# Patient Record
(demographics unavailable — no encounter records)

---

## 2024-12-12 NOTE — DISCUSSION/SUMMARY
[FreeTextEntry1] : The visit was provided via telehealth using real-time 2-way audio visual technology. The patient, Curtis Jean, was located at work in [place], at the time of the visit. The Genetic Counselor, An Arana, was located in [place], [place]. The patient and the Genetic Counselor both participated in the telehealth encounter. Genetic counseling student, Patricia Tan, also participated in this session. Consent for telehealth services was given on 2024 by the patient, Curtis Jean.  REASON FOR CONSULT Curtis Jean is a 48-year-old female who was referred by Dr. Vimal Cardenas for cancer genetic counseling and risk assessment due to a recent diagnosis of a neuroendocrine tumor and a personal history of colorectal polyps.   RELEVANT MEDICAL HISTORY Ms. 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 Colonoscopy performed in 2024 also identified four tubular adenomas and hyperplastic polyps in the rectum as well as colonic mucosa with foci of adenomatous changes and hyperplastic changes in the ascending colon, transverse colon, and descending colon. Focal adenomatous changes and hyperplastic polyps were also identified in the sigmoid colon. Chromogranin A levels were tested on 2024 and revealed to be abnormal, 637.6 ng/mL (normal range 0.0-101.8). PET scan performed in 2024 revealed intensely somatostatin-receptor avid lesion in the terminal ileum with adjacent metastatic lymphadenopathy, focus within the liver suspicious for metastatic disease, and intensely avid mediastinal nodes, or distant metastatic disease above the diaphragm is not excluded. MRI performed in 2024 revealed small subtle hepatic metastasis, focal enhancement/mural thickening corresponds to patient's known primary terminal ileal neuroendocrine tumor, and metastatic mesenteric adenopathy. Patient is planning to undergo 3 months of neoadjuvant somatostatin analog therapy prior to robotic right hemicolectomy and liver resection/ablation.  OTHER MEDICAL AND SURGICAL HISTORY:  -	 Medical History: hyperlipidemia, GERD -	Surgical History: bunionectomy, right knee surgery    PAST OB/GYN HISTORY:  Obstetrical History:   Age at Menarche: 9  Menopausal with LMP at age 45   Age at First Live Birth: 15  Oral Contraceptive Use: No Other Contraceptive Use: Depo-Provera injection, >20 years in total Hormone Replacement Therapy: No   CANCER SCREENING HISTORY:    Breast:  -	Mammography: last , reportedly normal. Patient reports that she is followed for a benign right breast nodule. -	Sonography: No -	MRI: No -	Biopsies: No  GYN: -	Pelvic Examination: last , reportedly normal  -	Sonography: No -	CA-125: No Colon: -	Colonoscopy: last 2024, multiple colorectal polyps were identified (see relevant medical history section) as well as a neuroendocrine tumor of the ileum. This was the patient's first colonoscopy. -	Upper Endoscopy: last 10/2024, inactive mild chronic gastritis with focal foveolar hyperplasia. Fine needle biopsy was performed for periesophageal lymph node which revealed scant fibrous and fibromuscular tissue and heme, and tissue was noted to be insufficient for evaluation.  -	FOBT: Patient reports a positive FOBT this year with her primary care team which prompted her undergoing a colonoscopy. Skin:   -	FBSE: No -	Lesions biopsied/removed: Yes, patient reports having a benign skin lesion excised >10 years ago.    SOCIAL HISTORY: -	Tobacco-product use: Yes, former (quit 5 months ago; approximately 30 years in total)  FAMILY HISTORY: Maternal ancestry and paternal ancestry were reported as [place]. Ashkenazi Buddhism ancestry and consanguinity were denied. A detailed family history of cancer was ascertained. Relevant diagnoses are detailed below and in the scanned pedigree.   To Ms. Jean's knowledge, no one in the family has had germline testing for cancer susceptibility.   	 	RISK ASSESSMENT: Ms. Jean's personal history of colorectal polyps and a neuroendocrine tumor of the ileum and/or family history of colorectal cancer is suggestive of an inherited predisposition to colorectal polyps/colorectal cancer and neuroendocrine tumors and related cancers. We recommended genetic testing for genes associated with colorectal cancer and neuroendocrine tumors. This test analyzes the following genes: APC, AXIN2, BMPR1A, CDH1, CDKN1B, EPCAM, FH, GREM1, MAX, MBD4, MEN1, MLH1, MSH2, MSH3, MSH6, MUTYH, NF1, NTHL1, PMS2, POLD1, POLE, OTMIA2U, PTEN, RET, SDHA, SDHAF2, SDHB, SDHC, SDHD, SMAD4, STK11, BNHU938, TP53, TSC1, TSC2, VHL.  We discussed the risks, benefits and limitations, and implications of genetic testing. We also discussed the psychosocial implications of genetic testing. Possible test results were reviewed with Ms. Jean, along with associated medical management options.   Ms. Jean verbally consented to the above mentioned genetic testing panel. Informed consent form was emailed to the patient, and a saliva sample kit will be mailed to the patient. Once the sample has been collected and sent out, Ms. Jean will inform us.   PLAN:  1.	Saliva kit was sent to Ms. Jean's home for collection. Once collected and dropped off, patient will inform us.  2.	Ms. Jean was sent a copy of the informed consent via email to be filled, signed, and returned to us. 3.	We will contact Ms. Jean once the results are available and will schedule a follow-up appointment, as needed. Results generally return in 2-3 weeks from the day the sample is received in the lab.  For any additional questions please call [facility] at [phone].    An Arana MS, [facility] Genetic Counselor, [facility]   CC:  Dr. Vimal Cardenas

## 2025-01-17 NOTE — DISCUSSION/SUMMARY
[FreeTextEntry1] : RESULTS TRANSMISSION Curtis Jean is a 48-year-old female who was called on 01/13/2025 for a discussion regarding their genetic testing results related to hereditary cancer predisposition.   Ms. Jean was originally seen at [facility] on 12/11/2024 for hereditary cancer predisposition risk assessment due to a recent diagnosis of a neuroendocrine tumor and a personal history of colorectal polyps. Ms. Jean decided to pursue genetic testing for genes associated with colorectal cancer and neuroendocrine tumors offered by [facility].  TEST RESULTS:   PMS2 VARIANT OF UNCERTAIN SIGNIFICANCE (VUS) (c. 149G>A; p. G50D)  No pathogenic (disease-causing) variants or additional VUSs were detected in the following genes:  APC, AXIN2, BMPR1A, CDH1, CDKN1B, EPCAM, FH, GREM1, MAX, MBD4, MEN1, MLH1, MSH2, MSH3, MSH6, MUTYH, NF1, NTHL1, PMS2, POLD1, POLE, OHBMI4I, PTEN, RET, SDHA, SDHAF2, SDHB, SDHC, SDHD, SMAD4, STK11, VKDY666, TP53, TSC1, TSC2, VHL.  RESULTS INTERPRETATION AND ASSESSMENT: At this time the available evidence is insufficient to determine the role of this VUS in disease and the clinical significance of this result is uncertain. Individuals with a pathogenic mutation in the PMS2 gene may have an increased risk for colorectal cancer and endometrial cancer. It is unknown if the patient has an increased risk for the cancers associated with the PMS2 gene at this time.   The detection of this VUS should not currently change the patient's medical management. It is NOT recommended at this time that family members use this result for predictive genetic testing or medical management decisions. With more research, a VUS may be reclassified as either disease-causing or benign. Ms. Jean was encouraged to contact us every 2-3 years to inquire about any new information for this variant, or sooner if there are any changes in her personal or family history of cancer.  Such updates could possibly change our risk assessment and recommendations.  We also discussed that, while no clear cause of the patient's personal and family history of cancer was identified, this result, while reassuring, does entirely not rule out a hereditary cancer risk in the patient. It is possible, although unlikely, the patient has a mutation in one of the genes tested that is not detectable by this analysis, or has a mutation in a different gene, either known or unknown. It is also possible there is a hereditary cancer predisposition in the family, but the patient did not inherit it.  Given Ms. Jean's personal and current reported family history of cancer, and her negative genetic test results, the following screening guidelines and risk-reducing recommendations were discussed:  COLON: - Long-term management and surveillance should be based on Ms. Jean's on- or post-treatment protocol for her personal history of a neuroendocrine tumor of the ileum as recommended by her oncology team. -We also discussed with Ms. Jean that she should follow her gastroenterologist's recommendations for colonoscopy intervals based on her personal history of colorectal polyps on her first colonoscopy in 08/2024. She stated that she plans to undergo her next colonoscopy in the next 2-3 months. She was highly encouraged to call [facility] after she undergoes her next colonoscopy to share her records with [facility] for review as pathology results from her next colonoscopy may change our management recommendations.   OTHER:  - In the absence of other indications, Ms. Jean should practice age-appropriate cancer screening of other organ systems as recommended for the general population.  In addition, we discussed Ms. Jean's siblings should undergo colonoscopies at their current ages if they have not started to do so.    PLAN: 1.	See above for recommended management.  2.	Testing of family members based on this VUS is not recommended.  3.	The patient was encouraged to contact us every 2-3 years to check on any changes in interpretation of the VUS, or sooner if there are changes in her personal or family history of cancer. 4.	Patient informed consult note(s) will be available through their [facility] patient portal and genetic test results will be released via [facility]'s laboratory portal.  5.	Ms. Jean will recontact [facility] after her next colonoscopy to review the above management recommendations.    For any additional questions please call [facility] at [phone].    An Arana MS, [facility] Genetic Counselor, [facility]   CC:  Patient Dr. Vimal Cardenas

## 2025-02-05 NOTE — HISTORY OF PRESENT ILLNESS
[de-identified] : 48F with pmhx of HLD, GERD underwent colonoscopy with extensive polyps removed pathology tubular adenoma. Additional polypoid lesion seen in the terminal ileum, atypical for adenoma, biopsies taken showing neuroendocrine tumor referred by Dr. Bridges.  C/o constipation but currently going daily, tolerating diet, denies weight loss. Guaiac stool was positive at PCP and pt was referred for colonoscopy.   24 PET- 1.  Intensely somatostatin-receptor avid lesion in the terminal ileum with adjacent metastatic lymphadenopathy. 2.  Focus within the liver suspicious for metastatic disease. 3.  Intensely avid mediastinal nodes, or distant metastatic disease above the diaphragm is not excluded.    MRI abd-IMPRESSION: 1. Small subtle hepatic metastasis. 2. 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 3. Metastatic mesenteric adenopathy.  Labs- CBC CMP CA 19-9- WNL CromoA-637.6  24- Pt reports feeling well, denies abdominal pain at this time. Tolerating diet. HR in increase as pt is very anxious to find out test results.   EGD/EUS (10/16/2024): One mediastinal lymph node abutting the mid esophagus was found at 24 cm from the incisors. It was round, hypoechoic, and well defined. It measured 8 mm by 5 mm in maximal dimensions. Fine needle biopsy was performed. Also, biopsy from stomach was obtained. Path-1 Stomach biopsy 2 PeriEsophageal lymph node FNB Final Diagnosis 1. Stomach, biopsy  Chronic gastritis, mild, inactive with focal foveolar hyperplasia  Cresyl violet stain for H. pylori is negative 2. Periesophageal lymph node, fineneedle biopsy  Scant fibrous and fibromuscular tissue and heme  Tissue insufficient for evaluation Pt has been on lanreotide injection monthly with Dr. Webster. Chromogranin level decreased from 637.6 to 148.1 12/10/2024,  189.3 as of 1/10/2025.  2025 PET (NW)- 1.  Previously seen intensely somatostatin-receptor avid lesion in the terminal ileum appears somewhat smaller with decreased uptake. However, the distribution of activity at immediately adjacent lymph nodes may have changed, with some nodes now suspected to demonstrate markedly increased somatostatin receptor uptake. 2.  Revisualized focus within the LEFT lobe of the liver is essentially unchanged. Additional small focus is suspected in the RIGHT lobe along the border of the liver; repeat MRI may be helpful. 3.  Unchanged somatostatin receptor avid paraesophageal node in the chest.  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   ECO PCP: Dr. LAN, T[place] [address] [place] [id number]. Directions [phone]. GI: Dr. Bridges

## 2025-02-05 NOTE — ASSESSMENT
[FreeTextEntry1] : 48F with pmhx of HLD, GERD underwent colonoscopy with extensive polyps removed pathology tubular adenoma. Additional polypoid lesion seen in the terminal ileum, atypical for adenoma, biopsies taken showing neuroendocrine tumor referred by Dr. Bridges.   9/4/24 PET- 1. Intensely somatostatin-receptor avid lesion in the terminal ileum with adjacent metastatic lymphadenopathy. 2. Focus within the liver suspicious for metastatic disease. 3. Intensely avid mediastinal nodes, or distant metastatic disease above the diaphragm is not excluded.  Plan: - As per [facility] TB consensus recommendations were made for neoadjuvant somatostatin analog based on biopsy for 3 months for tumor stability -Candidate for surgical resection- robotic right hemicolectomy and liver resection/ ablation post treatment -EUS to bx new esophageal node seen on imaging- Will email Dr. Bridges to schedule.  -PET scan ordered for tumor biology/ treatment response after 4 months of systemic treatment. -Return 4 months after somatostatin analog treatment   I have discussed the diagnosis, therapeutic plan and options with the patient at length. Patient expressed verbal understanding to proceed with proposed plan. All questions answered.

## 2025-02-05 NOTE — CONSULT LETTER
[Dear  ___] : Dear  [unfilled], [Consult Letter:] : I had the pleasure of evaluating your patient, [unfilled]. [Please see my note below.] : Please see my note below. [Consult Closing:] : Thank you very much for allowing me to participate in the care of this patient.  If you have any questions, please do not hesitate to contact me. [Sincerely,] : Sincerely, [DrAzam  ___] : Dr. BECERRA [FreeTextEntry3] : Vimal Cardenas MD, FICS, FACS, ADITI Director of Surgical Oncology- [facility] , Department of Surgery [facility] at [address]- [address]   [address] [place], [place]- [address]   (mob) [phone] (o) [phone] (f) [phone]

## 2025-02-05 NOTE — CONSULT LETTER
[Dear  ___] : Dear  [unfilled], [Consult Letter:] : I had the pleasure of evaluating your patient, [unfilled]. [Please see my note below.] : Please see my note below. [Consult Closing:] : Thank you very much for allowing me to participate in the care of this patient.  If you have any questions, please do not hesitate to contact me. [Sincerely,] : Sincerely, [DrAzam  ___] : Dr. BECERRA [FreeTextEntry3] : Vimal Cardenas MD, FICS, FACS, ADITI Director of Surgical Oncology- [facility] , Department of Surgery [facility] at [address]- [address]   [address] [place], [place]- [address]   (mob) [phone] (o) [phone] (f) [phone]

## 2025-02-05 NOTE — HISTORY OF PRESENT ILLNESS
[de-identified] : 48F with pmhx of HLD, GERD underwent colonoscopy with extensive polyps removed pathology tubular adenoma. Additional polypoid lesion seen in the terminal ileum, atypical for adenoma, biopsies taken showing neuroendocrine tumor referred by Dr. Bridges.  C/o constipation but currently going daily, tolerating diet, denies weight loss. Guaiac stool was positive at PCP and pt was referred for colonoscopy.   24 PET- 1.  Intensely somatostatin-receptor avid lesion in the terminal ileum with adjacent metastatic lymphadenopathy. 2.  Focus within the liver suspicious for metastatic disease. 3.  Intensely avid mediastinal nodes, or distant metastatic disease above the diaphragm is not excluded.    MRI abd-IMPRESSION: 1. Small subtle hepatic metastasis. 2. 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 3. Metastatic mesenteric adenopathy.  Labs- CBC CMP CA 19-9- WNL CromoA-637.6  24- Pt reports feeling well, denies abdominal pain at this time. Tolerating diet. HR in increase as pt is very anxious to find out test results.   EGD/EUS (10/16/2024): One mediastinal lymph node abutting the mid esophagus was found at 24 cm from the incisors. It was round, hypoechoic, and well defined. It measured 8 mm by 5 mm in maximal dimensions. Fine needle biopsy was performed. Also, biopsy from stomach was obtained. Path-1 Stomach biopsy 2 PeriEsophageal lymph node FNB Final Diagnosis 1. Stomach, biopsy  Chronic gastritis, mild, inactive with focal foveolar hyperplasia  Cresyl violet stain for H. pylori is negative 2. Periesophageal lymph node, fineneedle biopsy  Scant fibrous and fibromuscular tissue and heme  Tissue insufficient for evaluation Pt has been on lanreotide injection monthly with Dr. Webster. Chromogranin level decreased from 637.6 to 148.1 12/10/2024,  189.3 as of 1/10/2025.  2025 PET (NW)- 1.  Previously seen intensely somatostatin-receptor avid lesion in the terminal ileum appears somewhat smaller with decreased uptake. However, the distribution of activity at immediately adjacent lymph nodes may have changed, with some nodes now suspected to demonstrate markedly increased somatostatin receptor uptake. 2.  Revisualized focus within the LEFT lobe of the liver is essentially unchanged. Additional small focus is suspected in the RIGHT lobe along the border of the liver; repeat MRI may be helpful. 3.  Unchanged somatostatin receptor avid paraesophageal node in the chest.  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   ECO PCP: Dr. LAN, TY [address] [place] [id number]. Directions [phone]. GI: Dr. Bridges

## 2025-02-18 NOTE — CONSULT LETTER
[( Thank you for referring [unfilled] for consultation for _____ )] : Thank you for referring [unfilled] for consultation for [unfilled] [DrAzam ___] : Dr. BECERRA [FreeTextEntry3] : Vimal Cardenas MD, FICS, FACS, ADITI Director of Surgical Oncology- [facility] , Department of Surgery [facility] at [address]- [address]   [address] [place], [place]- [address]   (mob) [phone] (o) [phone] (f) [phone]

## 2025-02-18 NOTE — HISTORY OF PRESENT ILLNESS
[de-identified] : 48F with pmhx of HLD, GERD underwent colonoscopy with extensive polyps removed pathology tubular adenoma. Additional polypoid lesion seen in the terminal ileum, atypical for adenoma, biopsies taken showing neuroendocrine tumor referred by Dr. Bridges.  C/o constipation but currently going daily, tolerating diet, denies weight loss. Guaiac stool was positive at PCP and pt was referred for colonoscopy.   24 PET- 1.  Intensely somatostatin-receptor avid lesion in the terminal ileum with adjacent metastatic lymphadenopathy. 2.  Focus within the liver suspicious for metastatic disease. 3.  Intensely avid mediastinal nodes, or distant metastatic disease above the diaphragm is not excluded.    MRI abd-IMPRESSION: 1. Small subtle hepatic metastasis. 2. 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 3. Metastatic mesenteric adenopathy.  Labs- CBC CMP CA 19-9- WNL CromoA-637.6  24- Pt reports feeling well, denies abdominal pain at this time. Tolerating diet. HR in increase as pt is very anxious to find out test results.   EGD/EUS (10/16/2024): One mediastinal lymph node abutting the mid esophagus was found at 24 cm from the incisors. It was round, hypoechoic, and well defined. It measured 8 mm by 5 mm in maximal dimensions. Fine needle biopsy was performed. Also, biopsy from stomach was obtained. Path-1 Stomach biopsy 2 PeriEsophageal lymph node FNB Final Diagnosis 1. Stomach, biopsy  Chronic gastritis, mild, inactive with focal foveolar hyperplasia  Cresyl violet stain for H. pylori is negative 2. Periesophageal lymph node, fineneedle biopsy  Scant fibrous and fibromuscular tissue and heme  Tissue insufficient for evaluation Pt has been on lanreotide injection monthly with Dr. Webster. Chromogranin level decreased from 637.6 to 148.1 12/10/2024,  189.3 as of 1/10/2025.  2025 PET (NW)- 1.  Previously seen intensely somatostatin-receptor avid lesion in the terminal ileum appears somewhat smaller with decreased uptake. However, the distribution of activity at immediately adjacent lymph nodes may have changed, with some nodes now suspected to demonstrate markedly increased somatostatin receptor uptake. 2.  Revisualized focus within the LEFT lobe of the liver is essentially unchanged. Additional small focus is suspected in the RIGHT lobe along the border of the liver; repeat MRI may be helpful. 3.  Unchanged somatostatin receptor avid paraesophageal node in the chest.  2025 Pt reports feeling well. Denies nausea, vomiting, diarrhea, hematochezia or steatorrhea, and unintentional weight loss at this time.   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   ECO PCP: Dr. LAN, JERI [address] [place] [address]. Directions [phone]. GI: Dr. Bridges

## 2025-02-18 NOTE — PHYSICAL EXAM
[Normal] : supple, no neck mass and thyroid not enlarged [Normal Neck Lymph Nodes] : normal neck lymph nodes  [Normal Supraclavicular Lymph Nodes] : normal supraclavicular lymph nodes [Normal Groin Lymph Nodes] : normal groin lymph nodes [Normal Axillary Lymph Nodes] : normal axillary lymph nodes [Normal] : oriented to person, place and time, with appropriate affect [FreeTextEntry1] :   COVID -19 precautions as per [facility] policy was universally followed. [de-identified] : Abdomen soft, nontender, nondistended, no palpable masses.

## 2025-02-18 NOTE — HISTORY OF PRESENT ILLNESS
[de-identified] : 48F with pmhx of HLD, GERD underwent colonoscopy with extensive polyps removed pathology tubular adenoma. Additional polypoid lesion seen in the terminal ileum, atypical for adenoma, biopsies taken showing neuroendocrine tumor referred by Dr. Bridges.  C/o constipation but currently going daily, tolerating diet, denies weight loss. Guaiac stool was positive at PCP and pt was referred for colonoscopy.   24 PET- 1.  Intensely somatostatin-receptor avid lesion in the terminal ileum with adjacent metastatic lymphadenopathy. 2.  Focus within the liver suspicious for metastatic disease. 3.  Intensely avid mediastinal nodes, or distant metastatic disease above the diaphragm is not excluded.    MRI abd-IMPRESSION: 1. Small subtle hepatic metastasis. 2. 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 3. Metastatic mesenteric adenopathy.  Labs- CBC CMP CA 19-9- WNL CromoA-637.6  24- Pt reports feeling well, denies abdominal pain at this time. Tolerating diet. HR in increase as pt is very anxious to find out test results.   EGD/EUS (10/16/2024): One mediastinal lymph node abutting the mid esophagus was found at 24 cm from the incisors. It was round, hypoechoic, and well defined. It measured 8 mm by 5 mm in maximal dimensions. Fine needle biopsy was performed. Also, biopsy from stomach was obtained. Path-1 Stomach biopsy 2 PeriEsophageal lymph node FNB Final Diagnosis 1. Stomach, biopsy  Chronic gastritis, mild, inactive with focal foveolar hyperplasia  Cresyl violet stain for H. pylori is negative 2. Periesophageal lymph node, fineneedle biopsy  Scant fibrous and fibromuscular tissue and heme  Tissue insufficient for evaluation Pt has been on lanreotide injection monthly with Dr. Webster. Chromogranin level decreased from 637.6 to 148.1 12/10/2024,  189.3 as of 1/10/2025.  2025 PET (NW)- 1.  Previously seen intensely somatostatin-receptor avid lesion in the terminal ileum appears somewhat smaller with decreased uptake. However, the distribution of activity at immediately adjacent lymph nodes may have changed, with some nodes now suspected to demonstrate markedly increased somatostatin receptor uptake. 2.  Revisualized focus within the LEFT lobe of the liver is essentially unchanged. Additional small focus is suspected in the RIGHT lobe along the border of the liver; repeat MRI may be helpful. 3.  Unchanged somatostatin receptor avid paraesophageal node in the chest.  2025 Pt reports feeling well. Denies nausea, vomiting, diarrhea, hematochezia or steatorrhea, and unintentional weight loss at this time.   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   [facility] PCP: Dr. LAN, JERI [address] [place] [id number]. Directions [phone]. GI: Dr. Bridges

## 2025-02-18 NOTE — CONSULT LETTER
[( Thank you for referring [unfilled] for consultation for _____ )] : Thank you for referring [unfilled] for consultation for [unfilled] [DrAzam ___] : Dr. BECERRA [FreeTextEntry3] : Vimal Cardenas MD, FICS, FACS, ADITI Director of Surgical Oncology- [facility] , Department of Surgery [facility] at [address]- [address]   [address] [place], [place]- [address]   (mob) [phone] (o) [phone] (f) [phone]

## 2025-02-18 NOTE — PHYSICAL EXAM
[Normal] : supple, no neck mass and thyroid not enlarged [Normal Neck Lymph Nodes] : normal neck lymph nodes  [Normal Supraclavicular Lymph Nodes] : normal supraclavicular lymph nodes [Normal Groin Lymph Nodes] : normal groin lymph nodes [Normal Axillary Lymph Nodes] : normal axillary lymph nodes [Normal] : oriented to person, place and time, with appropriate affect [FreeTextEntry1] :   COVID -19 precautions as per [facility] policy was universally followed. [de-identified] : Abdomen soft, nontender, nondistended, no palpable masses.

## 2025-02-18 NOTE — ASSESSMENT
[FreeTextEntry1] : 48F with pmhx of HLD, GERD underwent colonoscopy with extensive polyps removed pathology tubular adenoma. Additional polypoid lesion seen in the terminal ileum, atypical for adenoma, biopsies taken showing neuroendocrine tumor referred by Dr. Bridges.   9/4/24 PET- 1. Intensely somatostatin-receptor avid lesion in the terminal ileum with adjacent metastatic lymphadenopathy. 2. Focus within the liver suspicious for metastatic disease. 3. Intensely avid mediastinal nodes, or distant metastatic disease above the diaphragm is not excluded.  02/03/2025- PET scan-  1.  Previously seen intensely somatostatin-receptor avid lesion in the terminal ileum appears somewhat smaller with decreased uptake. However, the distribution of activity at immediately adjacent lymph nodes may have changed, with some nodes now suspected to demonstrate markedly increased somatostatin receptor uptake. 2.  Revisualized focus within the LEFT lobe of the liver is essentially unchanged. Additional small focus is suspected in the RIGHT lobe along the border of the liver; repeat MRI may be helpful. 3.  Unchanged somatostatin receptor avid paraesophageal node in the chest.  02/06/2025 MRI abdomen- *  Stable subcentimeter metastasis within the left hepatic lobe compared to 9/18/2024. *  No new liver lesion. Specifically, no MRI correlate for the small focus of gallium dotatate activity seen within the right hepatic lobe on prior PET/CT.  Plan: -Cased discussed at [facility] TB and surgical resection was recommended. -Robotic surgical colon resection - amenable to a right coloecotmy -Discussed PET/MRI results - CT AP to assess liver lesion (3 phase) and define tumor burden - Bowel prep, prior to surgery - Pt will need preop medical optimization/ clearance   I have discussed the risks, benefits, alternatives, complications including but not limited bleeding, infection, damage to adjacent structures, sepsis, need for further procedures, anastomotic leak, sepsis, tumor recurrence to the patient in detail. Patient expressed verbal understanding. Written informed consent to be obtained in the preoperative period.   I have discussed the diagnosis, therapeutic plan and options with the patient at length. Patient expressed verbal understanding to proceed with proposed plan. All questions answered.

## 2025-02-18 NOTE — CONSULT LETTER
[( Thank you for referring [unfilled] for consultation for _____ )] : Thank you for referring [unfilled] for consultation for [unfilled] [DrAzam ___] : Dr. BECERRA [FreeTextEntry3] : Vimal Cardenas MD, FICS, FACS, ADITI Director of Surgical Oncology- [facility] , Department of Surgery [facility] at [address]- [address]   [address] [place], [place]- [address]   (mob) [phone] (o) [phone] (f) [phone]

## 2025-02-18 NOTE — PHYSICAL EXAM
[Normal] : supple, no neck mass and thyroid not enlarged [Normal Neck Lymph Nodes] : normal neck lymph nodes  [Normal Supraclavicular Lymph Nodes] : normal supraclavicular lymph nodes [Normal Groin Lymph Nodes] : normal groin lymph nodes [Normal Axillary Lymph Nodes] : normal axillary lymph nodes [Normal] : oriented to person, place and time, with appropriate affect [FreeTextEntry1] :   COVID -19 precautions as per [facility] policy was universally followed. [de-identified] : Abdomen soft, nontender, nondistended, no palpable masses.

## 2025-02-18 NOTE — CONSULT LETTER
[( Thank you for referring [unfilled] for consultation for _____ )] : Thank you for referring [unfilled] for consultation for [unfilled] [DrAzam ___] : Dr. BECERRA [FreeTextEntry3] : Vimal Cardenas MD, FICS, FACS, ADITI Director of Surgical Oncology- [facility] , Department of Surgery [facility] at [address]- [address]   [address] [place]- [address]   (mob) [phone] (o) [phone] (f) [phone]

## 2025-02-18 NOTE — PHYSICAL EXAM
[Normal] : supple, no neck mass and thyroid not enlarged [Normal Neck Lymph Nodes] : normal neck lymph nodes  [Normal Supraclavicular Lymph Nodes] : normal supraclavicular lymph nodes [Normal Groin Lymph Nodes] : normal groin lymph nodes [Normal Axillary Lymph Nodes] : normal axillary lymph nodes [Normal] : oriented to person, place and time, with appropriate affect [FreeTextEntry1] :   COVID -19 precautions as per [facility] policy was universally followed. [de-identified] : Abdomen soft, nontender, nondistended, no palpable masses.

## 2025-02-18 NOTE — HISTORY OF PRESENT ILLNESS
[de-identified] : 48F with pmhx of HLD, GERD underwent colonoscopy with extensive polyps removed pathology tubular adenoma. Additional polypoid lesion seen in the terminal ileum, atypical for adenoma, biopsies taken showing neuroendocrine tumor referred by Dr. Bridges.  C/o constipation but currently going daily, tolerating diet, denies weight loss. Guaiac stool was positive at PCP and pt was referred for colonoscopy.   24 PET- 1.  Intensely somatostatin-receptor avid lesion in the terminal ileum with adjacent metastatic lymphadenopathy. 2.  Focus within the liver suspicious for metastatic disease. 3.  Intensely avid mediastinal nodes, or distant metastatic disease above the diaphragm is not excluded.    MRI abd-IMPRESSION: 1. Small subtle hepatic metastasis. 2. 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 3. Metastatic mesenteric adenopathy.  Labs- CBC CMP CA 19-9- WNL CromoA-637.6  24- Pt reports feeling well, denies abdominal pain at this time. Tolerating diet. HR in increase as pt is very anxious to find out test results.   EGD/EUS (10/16/2024): One mediastinal lymph node abutting the mid esophagus was found at 24 cm from the incisors. It was round, hypoechoic, and well defined. It measured 8 mm by 5 mm in maximal dimensions. Fine needle biopsy was performed. Also, biopsy from stomach was obtained. Path-1 Stomach biopsy 2 PeriEsophageal lymph node FNB Final Diagnosis 1. Stomach, biopsy  Chronic gastritis, mild, inactive with focal foveolar hyperplasia  Cresyl violet stain for H. pylori is negative 2. Periesophageal lymph node, fineneedle biopsy  Scant fibrous and fibromuscular tissue and heme  Tissue insufficient for evaluation Pt has been on lanreotide injection monthly with Dr. Webster. Chromogranin level decreased from 637.6 to 148.1 12/10/2024,  189.3 as of 1/10/2025.  2025 PET (NW)- 1.  Previously seen intensely somatostatin-receptor avid lesion in the terminal ileum appears somewhat smaller with decreased uptake. However, the distribution of activity at immediately adjacent lymph nodes may have changed, with some nodes now suspected to demonstrate markedly increased somatostatin receptor uptake. 2.  Revisualized focus within the LEFT lobe of the liver is essentially unchanged. Additional small focus is suspected in the RIGHT lobe along the border of the liver; repeat MRI may be helpful. 3.  Unchanged somatostatin receptor avid paraesophageal node in the chest.  2025 Pt reports feeling well. Denies nausea, vomiting, diarrhea, hematochezia or steatorrhea, and unintentional weight loss at this time.   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   [facility] PCP: Dr. LAN, JERI [address] [place] [id number]. Directions [phone]. GI: Dr. Bridges

## 2025-02-18 NOTE — ASSESSMENT
[FreeTextEntry1] : 48F with pmhx of HLD, GERD underwent colonoscopy with extensive polyps removed pathology tubular adenoma. Additional polypoid lesion seen in the terminal ileum, atypical for adenoma, biopsies taken showing neuroendocrine tumor referred by Dr. Bridges.   9/4/24 PET- 1. Intensely somatostatin-receptor avid lesion in the terminal ileum with adjacent metastatic lymphadenopathy. 2. Focus within the liver suspicious for metastatic disease. 3. Intensely avid mediastinal nodes, or distant metastatic disease above the diaphragm is not excluded.  02/03/2025- PET scan-  1.  Previously seen intensely somatostatin-receptor avid lesion in the terminal ileum appears somewhat smaller with decreased uptake. However, the distribution of activity at immediately adjacent lymph nodes may have changed, with some nodes now suspected to demonstrate markedly increased somatostatin receptor uptake. 2.  Revisualized focus within the LEFT lobe of the liver is essentially unchanged. Additional small focus is suspected in the RIGHT lobe along the border of the liver; repeat MRI may be helpful. 3.  Unchanged somatostatin receptor avid paraesophageal node in the chest.  02/06/2025 MRI abdomen- *  Stable subcentimeter metastasis within the left hepatic lobe compared to 9/18/2024. *  No new liver lesion. Specifically, no MRI correlate for the small focus of gallium dotatate activity seen within the right hepatic lobe on prior PET/CT.  Plan: -Cased discussed at [facility] TB and surgical resection was recommended. -Robotic surgical colon resection - amenable to a right coloecotmy -Discussed PET/MRI results - CT AP to assess liver lesion (3 phase) and define tumor burden - Bowel prep, prior to surgery - Pt will need preop medical optimization/ clearance   I have discussed the risks, benefits, alternatives, complications including but not limited bleeding, infection, damage to adjacent structures, sepsis, need for further procedures, anastomotic leak, sepsis, tumor recurrence to the patient in detail. Patient expressed verbal understanding. Written informed consent to be obtained in the preoperative period.   I have discussed the diagnosis, therapeutic plan and options with the patient at length. Patient expressed verbal understanding to proceed with proposed plan. All questions answered.

## 2025-02-18 NOTE — HISTORY OF PRESENT ILLNESS
[de-identified] : 48F with pmhx of HLD, GERD underwent colonoscopy with extensive polyps removed pathology tubular adenoma. Additional polypoid lesion seen in the terminal ileum, atypical for adenoma, biopsies taken showing neuroendocrine tumor referred by Dr. Bridges.  C/o constipation but currently going daily, tolerating diet, denies weight loss. Guaiac stool was positive at PCP and pt was referred for colonoscopy.   24 PET- 1.  Intensely somatostatin-receptor avid lesion in the terminal ileum with adjacent metastatic lymphadenopathy. 2.  Focus within the liver suspicious for metastatic disease. 3.  Intensely avid mediastinal nodes, or distant metastatic disease above the diaphragm is not excluded.    MRI abd-IMPRESSION: 1. Small subtle hepatic metastasis. 2. 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 3. Metastatic mesenteric adenopathy.  Labs- CBC CMP CA 19-9- WNL CromoA-637.6  24- Pt reports feeling well, denies abdominal pain at this time. Tolerating diet. HR in increase as pt is very anxious to find out test results.   EGD/EUS (10/16/2024): One mediastinal lymph node abutting the mid esophagus was found at 24 cm from the incisors. It was round, hypoechoic, and well defined. It measured 8 mm by 5 mm in maximal dimensions. Fine needle biopsy was performed. Also, biopsy from stomach was obtained. Path-1 Stomach biopsy 2 PeriEsophageal lymph node FNB Final Diagnosis 1. Stomach, biopsy  Chronic gastritis, mild, inactive with focal foveolar hyperplasia  Cresyl violet stain for H. pylori is negative 2. Periesophageal lymph node, fineneedle biopsy  Scant fibrous and fibromuscular tissue and heme  Tissue insufficient for evaluation Pt has been on lanreotide injection monthly with Dr. Webster. Chromogranin level decreased from 637.6 to 148.1 12/10/2024,  189.3 as of 1/10/2025.  2025 PET (NW)- 1.  Previously seen intensely somatostatin-receptor avid lesion in the terminal ileum appears somewhat smaller with decreased uptake. However, the distribution of activity at immediately adjacent lymph nodes may have changed, with some nodes now suspected to demonstrate markedly increased somatostatin receptor uptake. 2.  Revisualized focus within the LEFT lobe of the liver is essentially unchanged. Additional small focus is suspected in the RIGHT lobe along the border of the liver; repeat MRI may be helpful. 3.  Unchanged somatostatin receptor avid paraesophageal node in the chest.  2025 Pt reports feeling well. Denies nausea, vomiting, diarrhea, hematochezia or steatorrhea, and unintentional weight loss at this time.   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   [facility] PCP: Dr. LAN, JERI [address] [place] [id number]. Directions [phone]. GI: Dr. Bridges

## 2025-02-18 NOTE — ASSESSMENT
[FreeTextEntry1] : 48F with pmhx of HLD, GERD underwent colonoscopy with extensive polyps removed pathology tubular adenoma. Additional polypoid lesion seen in the terminal ileum, atypical for adenoma, biopsies taken showing neuroendocrine tumor referred by Dr. Bridges.   9/4/24 PET- 1. Intensely somatostatin-receptor avid lesion in the terminal ileum with adjacent metastatic lymphadenopathy. 2. Focus within the liver suspicious for metastatic disease. 3. Intensely avid mediastinal nodes, or distant metastatic disease above the diaphragm is not excluded.  02/03/2025- PET scan-  1.  Previously seen intensely somatostatin-receptor avid lesion in the terminal ileum appears somewhat smaller with decreased uptake. However, the distribution of activity at immediately adjacent lymph nodes may have changed, with some nodes now suspected to demonstrate markedly increased somatostatin receptor uptake. 2.  Revisualized focus within the LEFT lobe of the liver is essentially unchanged. Additional small focus is suspected in the RIGHT lobe along the border of the liver; repeat MRI may be helpful. 3.  Unchanged somatostatin receptor avid paraesophageal node in the chest.  02/06/2025 MRI abdomen- *  Stable subcentimeter metastasis within the left hepatic lobe compared to 9/18/2024. *  No new liver lesion. Specifically, no MRI correlate for the small focus of gallium dotatate activity seen within the right hepatic lobe on prior PET/CT.  Plan: -Cased discussed at [facility] TB and surgical resection was recommended. -Robotic surgical colon resection - amenable to a right coloecotmy -Discussed PET/MRI results - CT AP to assess liver lesion (3 phase) and define tumor burden - Bowel prep, prior to surgery - Pt will need preop medical optimization/ clearance   I have discussed the risks, benefits, alternatives, complications including but not limited bleeding, infection, damage to adjacent structures, sepsis, need for further procedures, anastomotic leak, sepsis, tumor recurrence to the patient in detail. Patient expressed verbal understanding. Written informed consent to be obtained in the preoperative period.   I have discussed the diagnosis, therapeutic plan and options with the patient at length. Patient expressed verbal understanding to proceed with proposed plan. All questions answered.

## 2025-07-03 NOTE — REVIEW OF SYSTEMS
Past Medical History:   Diagnosis Date    Internal hemorrhoid        Past Surgical History:   Procedure Laterality Date    COLONOSCOPY  02/18/2020    COLONOSCOPY N/A 2/18/2020    Procedure: COLONOSCOPY/Suprep;  Surgeon: Agueda Jacques MD;  Location: [facility];  Service: Endoscopy;  Laterality: N/A;    CYSTOSCOPY W/ URETERAL STENT PLACEMENT Bilateral 1/10/2025    Procedure: CYSTOSCOPY, WITH URETERAL STENT INSERTION;  Surgeon: Isaiah Mckeon MD;  [facility];  Service: Urology;  Laterality: Bilateral;    IRRIGATION AND DEBRIDEMENT OF UPPER EXTREMITY Right 6/11/2024    Procedure: IRRIGATION AND DEBRIDEMENT, UPPER EXTREMITY;  Surgeon: Maryjo Chamberlain MD;  Location[facility] OR;  Service: Orthopedics;  Laterality: Right;    KNEE LIGAMENT RECONSTRUCTION Right     PROSTATE BIOPSY N/A 9/27/2024    Procedure: BIOPSY, PROSTATE/TRANSPERINEAL;  Surgeon: Isaiah Mckeon MD;  [facility];  Service: Urology;  Laterality: N/A;    REMOVAL-STENT Bilateral 1/10/2025    Procedure: REMOVAL-STENT;  Surgeon: Isaiah Mckeon MD;  [facility];  Service: Urology;  Laterality: Bilateral;    REPAIR OF EXTENSOR TENDON Right 6/11/2024    Procedure: REPAIR, TENDON, EXTENSOR LONG FINGER;  Surgeon: Maryjo Chamberlain MD;  Location: [facility];  Service: Orthopedics;  Laterality: Right;    RETROGRADE PYELOGRAPHY Bilateral 9/27/2024    Procedure: PYELOGRAM, RETROGRADE;  Surgeon: Isaiah Mckeon MD;  [facility];  Service: Urology;  Laterality: Bilateral;    RETROGRADE PYELOGRAPHY Bilateral 1/10/2025    Procedure: PYELOGRAM, RETROGRADE;  Surgeon: Isaiah Mckeon MD;  Location: [facility];  Service: Urology;  Laterality: Bilateral;    TENOLYSIS, EXTENSOR, HAND OR FINGER Right 6/11/2024    Procedure: TENOLYSIS, EXTENSOR, HAND OR FINGER;  Surgeon: Maryjo Chamberlain MD;  Location: [facility];  Service: Orthopedics;  Laterality: Right;    TENOSYNOVECTOMY Right 6/11/2024    Procedure: TENOSYNOVECTOMY, EXTENSOR  TENDON LONG FINGER;  Surgeon: Maryjo Chamberlain MD;  Location: [facility];  Service: Orthopedics;  Laterality: Right;    TRANSRECTAL ULTRASOUND EXAMINATION N/A 9/27/2024    Procedure: ULTRASOUND, RECTAL APPROACH;  Surgeon: Isaiah Mckeon MD;  Location: [facility] OR [address] [place] [address];  Service: Urology;  Laterality: N/A;    TRANSURETHRAL RESECTION OF PROSTATE N/A 9/27/2024    Procedure: TURP (TRANSURETHRAL RESECTION OF PROSTATE);  Surgeon: Isaiah Mckeon MD;  Location: [facility] OR [address] [place] [address];  Service: Urology;  Laterality: N/A;    URETERAL STENT PLACEMENT Bilateral 9/27/2024    Procedure: INSERTION, STENT, URETER;  Surgeon: Isaiah Mckeon MD;  Location: [facility] OR [address] [place] [address];  Service: Urology;  Laterality: Bilateral;       Review of patient's allergies indicates:  No Known Allergies    Family History       Problem Relation (Age of Onset)    Alcohol abuse Father    Arthritis Paternal Grandmother    Diabetes Maternal Grandfather    Hypertension Mother    No Known Problems Sister, Paternal Grandfather    Pancreatic cancer Maternal Grandmother    Prostate cancer Father, Maternal Grandfather    Seizures Father            Tobacco Use    Smoking status: Never    Smokeless tobacco: Never   Substance and Sexual Activity    Alcohol use: No    Drug use: No    Sexual activity: Yes     Partners: Female     Birth control/protection: None       Review of Systems   Genitourinary:  Positive for dysuria, frequency, hematuria and urgency. Negative for decreased urine volume, difficulty urinating, flank pain and penile pain.       Objective:     Temp:  [98.3 °F (36.8 °C)] 98.3 °F (36.8 °C)  Pulse:  [90] 90  Resp:  [19] 19  SpO2:  [97 %] 97 %  BP: (152)/(85) 152/85  Weight: 70.8 kg (156 lb)  Body mass index is 25.18 kg/m².           Drains       None                    Physical Exam  Constitutional:       Appearance: Normal appearance.   Eyes:      Pupils: Pupils are equal, round, and reactive to light.   Cardiovascular:      Rate and Rhythm: Normal rate.       Pulses: Normal pulses.   Pulmonary:      Effort: Pulmonary effort is normal.      Breath sounds: Normal breath sounds.   Abdominal:      General: Abdomen is flat.      Palpations: Abdomen is soft.      Tenderness: There is no right CVA tenderness or left CVA tenderness.   Genitourinary:     Comments: No suprapubic pain  Musculoskeletal:         General: Normal range of motion.      Cervical back: Normal range of motion.   Skin:     General: Skin is warm.      Capillary Refill: Capillary refill takes less than 2 seconds.   Neurological:      Mental Status: He is alert and oriented to person, place, and time.   Psychiatric:         Behavior: Behavior normal.          Significant Labs:    BMP:  Recent Labs   Lab 07/02/25  1548      K 4.6      CO2 27   BUN 16   CREATININE 1.2   CALCIUM 9.5       CBC:  Recent Labs   Lab 07/02/25  1548   WBC 4.71   HGB 13.9*   HCT 41.5          All pertinent labs results from the past 24 hours have been reviewed.    Significant Imaging:  All pertinent imaging results/findings from the past 24 hours have been reviewed.                   [Negative] : Heme/Lymph